# Patient Record
Sex: MALE | Race: ASIAN | NOT HISPANIC OR LATINO
[De-identification: names, ages, dates, MRNs, and addresses within clinical notes are randomized per-mention and may not be internally consistent; named-entity substitution may affect disease eponyms.]

---

## 2021-10-13 ENCOUNTER — APPOINTMENT (OUTPATIENT)
Dept: CARDIOLOGY | Facility: CLINIC | Age: 56
End: 2021-10-13
Payer: MEDICAID

## 2021-10-13 ENCOUNTER — APPOINTMENT (OUTPATIENT)
Dept: CARDIOLOGY | Facility: CLINIC | Age: 56
End: 2021-10-13
Payer: COMMERCIAL

## 2021-10-13 VITALS
SYSTOLIC BLOOD PRESSURE: 100 MMHG | OXYGEN SATURATION: 95 % | BODY MASS INDEX: 25.76 KG/M2 | DIASTOLIC BLOOD PRESSURE: 70 MMHG | HEIGHT: 67.72 IN | TEMPERATURE: 97.6 F | WEIGHT: 168 LBS | HEART RATE: 78 BPM | RESPIRATION RATE: 18 BRPM

## 2021-10-13 DIAGNOSIS — Z87.891 PERSONAL HISTORY OF NICOTINE DEPENDENCE: ICD-10-CM

## 2021-10-13 DIAGNOSIS — Z78.9 OTHER SPECIFIED HEALTH STATUS: ICD-10-CM

## 2021-10-13 DIAGNOSIS — R00.2 PALPITATIONS: ICD-10-CM

## 2021-10-13 PROBLEM — Z00.00 ENCOUNTER FOR PREVENTIVE HEALTH EXAMINATION: Status: ACTIVE | Noted: 2021-10-13

## 2021-10-13 PROCEDURE — 93306 TTE W/DOPPLER COMPLETE: CPT

## 2021-10-13 PROCEDURE — 93015 CV STRESS TEST SUPVJ I&R: CPT

## 2021-10-13 PROCEDURE — 99072 ADDL SUPL MATRL&STAF TM PHE: CPT

## 2021-10-13 PROCEDURE — ZZZZZ: CPT

## 2021-10-13 PROCEDURE — 99204 OFFICE O/P NEW MOD 45 MIN: CPT | Mod: 25

## 2021-10-13 RX ORDER — PROPRANOLOL HYDROCHLORIDE 10 MG/1
10 TABLET ORAL TWICE DAILY
Qty: 30 | Refills: 2 | Status: ACTIVE | COMMUNITY
Start: 2021-10-13 | End: 1900-01-01

## 2021-10-19 ENCOUNTER — NON-APPOINTMENT (OUTPATIENT)
Age: 56
End: 2021-10-19

## 2021-10-20 PROBLEM — Z87.891 FORMER SMOKER, STOPPED SMOKING IN DISTANT PAST: Status: ACTIVE | Noted: 2021-10-20

## 2021-10-20 PROBLEM — Z78.9 SOCIAL ALCOHOL USE: Status: ACTIVE | Noted: 2021-10-20

## 2021-10-20 NOTE — REASON FOR VISIT
[FreeTextEntry1] : 56 year-old male with no significant PMH presents for evaluation of palpitations  Patient reports that he had palpitations lasting 1-2 hours, it was not fast but it made him felt hot and he couldn't feel his pulse. Patient reports lower CP that feels like fullness lasting seconds but frequent. Patient reports epigastric discomfort. I advised patient to undergo an echocardiogram and a treadmill stress test. I advised patient to wear a Holter monitor.

## 2024-02-09 PROBLEM — R07.89 CHEST DISCOMFORT: Status: ACTIVE | Noted: 2021-10-13

## 2024-02-14 ENCOUNTER — APPOINTMENT (OUTPATIENT)
Dept: CARDIOLOGY | Facility: CLINIC | Age: 59
End: 2024-02-14
Payer: COMMERCIAL

## 2024-02-14 VITALS
RESPIRATION RATE: 16 BRPM | BODY MASS INDEX: 25.76 KG/M2 | WEIGHT: 168 LBS | HEART RATE: 85 BPM | OXYGEN SATURATION: 95 % | SYSTOLIC BLOOD PRESSURE: 108 MMHG | DIASTOLIC BLOOD PRESSURE: 69 MMHG | TEMPERATURE: 96.7 F

## 2024-02-14 DIAGNOSIS — R07.89 OTHER CHEST PAIN: ICD-10-CM

## 2024-02-14 PROCEDURE — 99214 OFFICE O/P EST MOD 30 MIN: CPT | Mod: 25

## 2024-02-14 PROCEDURE — 93306 TTE W/DOPPLER COMPLETE: CPT

## 2024-02-14 PROCEDURE — 93015 CV STRESS TEST SUPVJ I&R: CPT

## 2024-02-14 NOTE — REASON FOR VISIT
[FreeTextEntry1] : 58 year-old male with no significant PMH presents for followup.    Patient was last seen on 10/13/21 - Patient reports that he had palpitations lasting 1-2 hours, it was not fast but it made him felt hot and he couldn't feel his pulse. Patient reports lower CP that feels like fullness lasting seconds but frequent. Patient reports epigastric discomfort. I advised patient to undergo an echocardiogram and a treadmill stress test. I advised patient to wear a Holter monitor.  Patient underwent an echocardiogram and it showed normal LV function without significant valvular pathology. Patient underwent a treadmill stress test and completed 9.5 minutes of Olaf protocol.  There were upsloping ST depressions on ECG but no symptoms.  Following treadmill stress, there was no echocardiographic evidence of ischemia.  Patient wore a Holter and it showed average HR 77, rare PVCs, rare PACs, couplets, and 1 triplet.

## 2024-02-14 NOTE — HISTORY OF PRESENT ILLNESS
[FreeTextEntry1] : 2/14/24 - Please see NP note for HPI.  Pt reports shortness of breath associated with SSCP for 1 year, experiences daily, lasting 1 hour each time. It is not related to exertion. Pt describes as "feeling sputum' and needs to take a deep breath for relieve. Pt reports had seen PCP and done CXR without significant findings. Pt reports will have CT chest today. Pt also reports some positional lightheadedness. Patient denies palpitation. Patient denies h/o syncope.  Pt is on ASA 81 and simvastatin 20mg.  /69 HR 85.  Exam unremarkable.  PCP ordered chest CT for this afternoon.  I advised patient to undergo an echocardiogram and a treadmill stress test.  If unremarkable, I will consider trial of PPI.  10/13/21 - Patient reports that he had palpitations lasting 1-2 hours, it was not fast but it made him felt hot and he couldn't feel his pulse. Patient reports lower CP that feels like fullness lasting seconds but frequent. Patient reports epigastric discomfort. I advised patient to undergo an echocardiogram and a treadmill stress test. I advised patient to wear a Holter monitor.  Patient underwent an echocardiogram and it showed normal LV function without significant valvular pathology. Patient underwent a treadmill stress test and completed 9.5 minutes of Olaf protocol.  There were upsloping ST depressions on ECG but no symptoms.  Following treadmill stress, there was no echocardiographic evidence of ischemia.  Patient wore a Holter and it showed average HR 77, rare PVCs, rare PACs, couplets, and 1 triplet.